# Patient Record
Sex: FEMALE | Employment: FULL TIME
[De-identification: names, ages, dates, MRNs, and addresses within clinical notes are randomized per-mention and may not be internally consistent; named-entity substitution may affect disease eponyms.]

---

## 2017-11-12 ENCOUNTER — HEALTH MAINTENANCE LETTER (OUTPATIENT)
Age: 37
End: 2017-11-12

## 2020-01-27 ENCOUNTER — TELEPHONE (OUTPATIENT)
Dept: NEUROLOGY | Facility: CLINIC | Age: 40
End: 2020-01-27

## 2020-01-27 NOTE — TELEPHONE ENCOUNTER
Health Call Center    Phone Message    May a detailed message be left on voicemail: no    Reason for Call: Other: Pt, Veronica, was seeing Dr. Calvo from [facility], she would like to have a call back about a referral for Accupuncture.  [phone]     Action Taken: Message routed to:  [facility] Neurology

## 2020-01-28 NOTE — TELEPHONE ENCOUNTER
Returned phone call to patient to let her know she would need to be scheduled with a new provider, since she hasn't been seen for 4 years, and Dr. Calvo is no longer with the [facility]. She did not want to schedule at this time, and requested her last clinic visit note be faxed to her. She will try her PCP and see if they will write a referral to acupuncture.

## 2020-03-01 ENCOUNTER — HEALTH MAINTENANCE LETTER (OUTPATIENT)
Age: 40
End: 2020-03-01

## 2020-12-14 ENCOUNTER — HEALTH MAINTENANCE LETTER (OUTPATIENT)
Age: 40
End: 2020-12-14

## 2021-04-18 ENCOUNTER — HEALTH MAINTENANCE LETTER (OUTPATIENT)
Age: 41
End: 2021-04-18

## 2021-10-02 ENCOUNTER — HEALTH MAINTENANCE LETTER (OUTPATIENT)
Age: 41
End: 2021-10-02

## 2022-05-14 ENCOUNTER — HEALTH MAINTENANCE LETTER (OUTPATIENT)
Age: 42
End: 2022-05-14

## 2022-05-18 ENCOUNTER — MEDICAL CORRESPONDENCE (OUTPATIENT)
Dept: HEALTH INFORMATION MANAGEMENT | Facility: CLINIC | Age: 42
End: 2022-05-18
Payer: COMMERCIAL

## 2022-05-24 ENCOUNTER — TRANSCRIBE ORDERS (OUTPATIENT)
Dept: OTHER | Age: 42
End: 2022-05-24
Payer: COMMERCIAL

## 2022-05-24 DIAGNOSIS — H43.391 VITREOUS FLOATERS OF RIGHT EYE: Primary | ICD-10-CM

## 2022-05-24 DIAGNOSIS — G89.4 CHRONIC PAIN DISORDER: ICD-10-CM

## 2022-05-24 DIAGNOSIS — H04.123 DRY EYES, BILATERAL: ICD-10-CM

## 2022-05-26 ENCOUNTER — TELEPHONE (OUTPATIENT)
Dept: OPHTHALMOLOGY | Facility: CLINIC | Age: 42
End: 2022-05-26
Payer: COMMERCIAL

## 2022-05-26 NOTE — TELEPHONE ENCOUNTER
Visual snow referral to neuro-ophthalmology times 1.5 years    Scheduled first available with Dr. Wade June 21st at 0730      Pt aware of date/time/location/duration/main clinic number/hospital based clinic and would appreciate new patient pat    Toan Ibrahim RN 4:28 PM 05/26/22            [facility]    Phone Message    May a detailed message be left on voicemail: yes     Reason for Call: Appointment Intake    Referring Provider Name: LujanRonnie SHAD   Diagnosis and/or Symptoms: Vitreous floaters of right eye [H43.391]  Chronic pain disorder [G89.4]  Dry eyes, bilateral [H04.123]    Per protocols sending a TE for scheduling options. Please reach out to patient to schedule appt. Thank you     Action Taken: Message routed to:  [facility] EYE    Travel Screening: Not Applicable

## 2022-05-27 ENCOUNTER — TELEPHONE (OUTPATIENT)
Dept: OPHTHALMOLOGY | Facility: CLINIC | Age: 42
End: 2022-05-27
Payer: COMMERCIAL

## 2022-05-27 NOTE — TELEPHONE ENCOUNTER
Mailed out a new pt packet with time,date and a map     Angela Keating Communication Facilitator on 5/27/2022 at 8:24 AM

## 2022-06-21 ENCOUNTER — TELEPHONE (OUTPATIENT)
Dept: OPHTHALMOLOGY | Facility: CLINIC | Age: 42
End: 2022-06-21

## 2022-06-21 ENCOUNTER — OFFICE VISIT (OUTPATIENT)
Dept: OPHTHALMOLOGY | Facility: CLINIC | Age: 42
End: 2022-06-21
Attending: OPHTHALMOLOGY
Payer: COMMERCIAL

## 2022-06-21 DIAGNOSIS — H53.40 VISUAL FIELD DEFECT: Primary | ICD-10-CM

## 2022-06-21 DIAGNOSIS — H04.123 DRY EYES, BILATERAL: ICD-10-CM

## 2022-06-21 DIAGNOSIS — H53.40 VISUAL FIELD DEFECT: ICD-10-CM

## 2022-06-21 DIAGNOSIS — G89.4 CHRONIC PAIN DISORDER: ICD-10-CM

## 2022-06-21 DIAGNOSIS — H43.391 VITREOUS FLOATERS OF RIGHT EYE: ICD-10-CM

## 2022-06-21 PROCEDURE — 92083 EXTENDED VISUAL FIELD XM: CPT | Mod: 26 | Performed by: INTERNAL MEDICINE

## 2022-06-21 PROCEDURE — 92133 CPTRZD OPH DX IMG PST SGM ON: CPT | Performed by: OPHTHALMOLOGY

## 2022-06-21 PROCEDURE — 92133 CPTRZD OPH DX IMG PST SGM ON: CPT | Mod: 26 | Performed by: INTERNAL MEDICINE

## 2022-06-21 PROCEDURE — G0463 HOSPITAL OUTPT CLINIC VISIT: HCPCS

## 2022-06-21 PROCEDURE — 99204 OFFICE O/P NEW MOD 45 MIN: CPT | Mod: GC | Performed by: INTERNAL MEDICINE

## 2022-06-21 PROCEDURE — 92083 EXTENDED VISUAL FIELD XM: CPT | Performed by: OPHTHALMOLOGY

## 2022-06-21 RX ORDER — MIDODRINE HYDROCHLORIDE 2.5 MG/1
TABLET ORAL
COMMUNITY
Start: 2022-06-16

## 2022-06-21 ASSESSMENT — TONOMETRY
OD_IOP_MMHG: 16
OS_IOP_MMHG: 15
IOP_METHOD: TONOPEN

## 2022-06-21 ASSESSMENT — CONF VISUAL FIELD
OD_NORMAL: 1
METHOD: COUNTING FINGERS

## 2022-06-21 ASSESSMENT — CUP TO DISC RATIO
OD_RATIO: 0.1
OS_RATIO: 0.1

## 2022-06-21 ASSESSMENT — VISUAL ACUITY
OD_SC: 20/20
METHOD: SNELLEN - LINEAR
OS_SC: 20/25

## 2022-06-21 ASSESSMENT — EXTERNAL EXAM - RIGHT EYE: OD_EXAM: NORMAL

## 2022-06-21 ASSESSMENT — EXTERNAL EXAM - LEFT EYE: OS_EXAM: NORMAL

## 2022-06-21 NOTE — Clinical Note
"6/21/2022       RE: Veronica Valdovinos  [address]  [facility] [address]     Dear Colleague,    Thank you for referring your patient, Veronica Valdovinos, to the [facility] EYE [facility] at [facility]. Please see a copy of my visit note below.         Assessment & Plan     Veronica Valdovinos is a 42 year old female with the following diagnoses:   1. Vitreous floaters of right eye    2. Chronic pain disorder    3. Dry eyes, bilateral    4. Visual field defect         Patient was sent for consultation by Dr. Ronnie Lujan for visual snow.     HPI:  Patient got COVID vaccine in January 2021 and about a month later began to have floaters in both eyes. Started in the right eye but moved to the left eye. She has seen an two optometrists, 2 retina specialist, and Dr. Ng 2/1/22. Since seeing Dr. 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     She has been diagnosed with dry eye. Prior treatment with steroid gtt. Three months ago tried Regener-Eyes Ophthalmic Solution which helped somewhat. Lubricating drops 3-4x per day. Warm compresses every night which have helped. Dryness has improved since these interventions.     She also has had headaches with worsening light sensitivity.     She reports that she has numerous other systemic symptoms including shortness of breath without a definitive etiology and tachycardia. She is being worked up for POTS. She is searching for a unifying diagnosis.    Independent historians:  Patient    Review of outside testing:  MR brain wwo contrast 7/9/21  IMPRESSION:   HEAD MRI:   1.  Solitary nonspecific focus of T2 FLAIR hyperintensity in the right centrum semiovale, strictly nonspecific.   2.  No additional intracranial abnormality.     CERVICAL SPINE MRI:   1.  No abnormal cord signal. " "  2.  Multilevel cervical spondylosis.   3.  No high-grade spinal canal stenosis. Severe neural foraminal stenosis bilaterally at C5-C6. Moderate left neural foraminal stenosis at C6-C7.    My interpretation performed today of outside testing:  Not available for personal review    Review of outside clinical notes:  Dr. Ronnie Lujan clinic notes    Dr. Ng 2/1/22  Today, Veronica reports stabbing pain in the left eye at night a few times a week. Floaters started in the right eye about 10 months ago. She has seen 3 retina doctors along with a dry eye specialist. She reports that it started in the right eye and now has moved over to the left. She describes it as looking thru cotton wool that are constantly, and remains there with her eyes closed. Some are clear, some are dark, most blur her vision. The left eye has gotten more blurry in the last week. She calls them as lesions in her vision. She thinks they look like spot or vascular (with veins and arteries). They are through out all aspects of her vision and they tend to move with eye movement. She is very light sensitive and feels that it's very difficult for her to read. She did get a glasses prescription for working on the computer but doesn't feel that it helps.  For the last 2 weeks she sees the left periphery shake or quivers in the field of vision.   She reports that this started about 2 months after the COVID vaccine     1/14/2022, Dr. Fede Leos, Specialty: [facility]   - Patient presented to clinic for visual disturbance. Awareness as constant \"cotton wool\" appearance which obscures much of her vision  - Unable to determine if this sounds consistent with vitreous opacities or another unrelated phenomenon, such as visual snow or entopic phenomenon as dilated retinal exam showed no significant finding to associate with her symptoms. Vitrectomy was discussed - minimal if any syneresis noted on exam and no significant floaters/shodowing " visualized on near infrared OCT - result may no improve her symptoms.   - Consult with Neuro Ophthalmology to ensure no other identifiable causes prior to considering PPV.     Past medical history:  Migraines  Fibromyalgia  Raynaud's  Alopecia  Two provoked seizures nocturnal with abnormal EEG    Medications:   CALCIUM PO  DAILY MULTIVITAMIN PO  GLUCOSAMINE SULFATE PO  OMEGA 3 PO    Family history / social history:  Aunt with fibromyalgia  CAD in grandparents  No hx of glaucoma    ARMD in grandmother    Exam:  Visual acuity 20/20 right eye 20/25 left eye.  Color vision 11/11 right eye and 11/11 left eye.  Pupils ERRL no APD  Intraocular pressure 16 right eye and 15 left eye.  Anterior segment exam PEE OU.  Fundus exam ***.  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  This is an entity where patients see constant or nearly constant unformed hallucinations.  The most common description that patients give is television snow, however it has been described as red dots, pixelations, and black spots.  Given the normal visual field, no further workup is necessary.  I reassured the patient that this is a common phenomenon and is not vision threatening.  I also told the patient that learning to ignore it is the best strategy and that there is no treatment for it.   This was first described in an article published in Neurology. 1995 Apr;45(4):664-8. Persistent positive visual phenomena in migraine. Aravind GT1, Vasyl NJ, Chelsie SL, Jena POTTS, Cornelius F, Romaine GS.     She also has significant Dry eye symptoms.  I will start her on xiidra twice a day.      She has vasovagal episodes and is planning on going to [place] to have  this evaluated.  She also will see neuro-oph there.                Attending Physician Attestation:  Complete documentation of historical and exam elements from today's encounter can be found in the full encounter summary report (not reduplicated in this progress note).  I personally obtained the chief complaint(s) and history of present illness.  I confirmed and edited as necessary the review of systems, past medical/surgical history, family history, social history, and examination findings as documented by others; and I examined the patient myself.  I personally reviewed the relevant tests, images, and reports as documented above.  I formulated and edited as necessary the assessment and plan and discussed the findings and management plan with the patient and family. I personally reviewed the ophthalmic test(s) associated with this encounter, agree with the interpretation(s) as documented by the resident/fellow, and have edited the corresponding report(s) as necessary.  - Vinicio Barragan, DO   PGY-5 Neuro-Ophthalmology Fellow          Again, thank you for allowing me to participate in the care of your patient.      Sincerely,    Vinicio Wade MD

## 2022-06-21 NOTE — NURSING NOTE
"Chief Complaint(s) and History of Present Illness(es)     New Patient     In both eyes (Visual disturbance).  Onset was gradual.  This started 2 years ago.  Presenting in peripheral vision.  Charactertized as  blurred vision.  Occurring intermittently.  It is worse throughout the day.  Context:  night driving.  Since onset it is stable.  Associated symptoms include dryness, headache, photophobia and floaters.  Negative for double vision.  Pain was noted as 0/10.              Comments     Visual disturbance started off as floaters, gradually worsening. Increased fatigue and has had some neurological symptoms such as burning foot and increased heart rate.  Has been seen by retina and neuro-oph at [facility].  Will be getting tested for POTS at [place] soon, seeing neuro-oph and neurologist.  Feels like peripheral visual field is \"shaking and flashing.\"  Occasional tinnitus.  Has dry eyes each eye. Vision is stable with occasional blur, some nyctalopia with nighttime driving.  DIONICIO Boyer 6/21/2022 8:01 AM                  "

## 2022-06-21 NOTE — TELEPHONE ENCOUNTER
PA Initiation    Medication: lifitegrast (XIIDRA) 5 % opthalmic solution   Insurance Company: [employer] - Phone [phone] Fax [phone]  Pharmacy Filling the Rx: [id number] [place] [address] AT [place] [address]  Filling Pharmacy Phone: [phone]  Filling Pharmacy Fax: [phone]  Start Date: 6/21/2022

## 2022-06-21 NOTE — TELEPHONE ENCOUNTER
Prior Authorization Retail Medication Request    Medication/Dose: lifitegrast (XIIDRA) 5 % opthalmic solution  ICD code (if different than what is on RX):  Dry eyes, bilateral [H04.123]   Previously Tried and Failed:    Rationale:      Insurance Name:  [employer]  Insurance ID: [id number]    Pharmacy Information (if different than what is on RX)  Name:  [facility] [address] [place] [address] [address] AT [facility] & [place] [address]  Phone:  [phone]

## 2022-06-21 NOTE — LETTER
"2022         RE:  :  MRN: Veronica Valdovinos  1980  [id number]     Dear Dr. Lujan,    Thank you for asking me to see your very pleasant patient, Veronica Valdovinos, in neuro-ophthalmic consultation.  I would like to thank you for sending your records and I have summarized them in the history of present illness.   My assessment and plan are below.  For further details, please see my attached clinic note.      Assessment & Plan     Veronica Valdovinos is a 42 year old female with the following diagnoses:   1. Vitreous floaters of right eye    2. Chronic pain disorder    3. Dry eyes, bilateral    4. Visual field defect         Patient was sent for consultation by Dr. Ronnie Lujan for visual snow.     HPI:  Patient got COVID vaccine in 2021 and about a month later began to have floaters in both eyes. Started in the right eye but moved to the left eye. She has seen an two optometrists, 2 retina specialist, and Dr. Ng 22. Since seeing Dr. 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     She has been diagnosed with dry eye. Prior treatment with steroid gtt. Three months ago tried Regener-Eyes Ophthalmic Solution which helped somewhat. Lubricating drops 3-4x per day. Warm compresses every night which have helped. Dryness has improved since these interventions.     She also has had headaches with worsening light sensitivity.     She reports that she has numerous other systemic symptoms including shortness of breath without a definitive etiology and tachycardia. She is being worked up for POTS. She is searching for a unifying diagnosis.    Independent historians: Patient    Review of outside testing:  MR brain wwo contrast 21  IMPRESSION:   HEAD MRI:   1.  Solitary nonspecific focus of T2 FLAIR hyperintensity in the right centrum semiovale, strictly " "nonspecific.   2.  No additional intracranial abnormality.     CERVICAL SPINE MRI:   1.  No abnormal cord signal.   2.  Multilevel cervical spondylosis.   3.  No high-grade spinal canal stenosis. Severe neural foraminal stenosis bilaterally at C5-C6. Moderate left neural foraminal stenosis at C6-C7.    My interpretation performed today of outside testing:  Not available for personal review    Review of outside clinical notes:  Dr. Ronnie Lujan clinic notes    Dr. Ng 2/1/22  Today, Veronica reports stabbing pain in the left eye at night a few times a week. Floaters started in the right eye about 10 months ago. She has seen 3 retina doctors along with a dry eye specialist. She reports that it started in the right eye and now has moved over to the left. She describes it as looking thru cotton wool that are constantly, and remains there with her eyes closed. Some are clear, some are dark, most blur her vision. The left eye has gotten more blurry in the last week. She calls them as lesions in her vision. She thinks they look like spot or vascular (with veins and arteries). They are through out all aspects of her vision and they tend to move with eye movement. She is very light sensitive and feels that it's very difficult for her to read. She did get a glasses prescription for working on the computer but doesn't feel that it helps.  For the last 2 weeks she sees the left periphery shake or quivers in the field of vision.   She reports that this started about 2 months after the COVID vaccine     1/14/2022, Dr. Fede Leos, Specialty: [facility]   - Patient presented to clinic for visual disturbance. Awareness as constant \"cotton wool\" appearance which obscures much of her vision  - Unable to determine if this sounds consistent with vitreous opacities or another unrelated phenomenon, such as visual snow or entopic phenomenon as dilated retinal exam showed no significant finding to associate with her " symptoms. Vitrectomy was discussed - minimal if any syneresis noted on exam and no significant floaters/shodowing visualized on near infrared OCT - result may no improve her symptoms.   - Consult with Neuro Ophthalmology to ensure no other identifiable causes prior to considering PPV.     Past medical history:  Migraines  Fibromyalgia  Raynaud's  Alopecia  Two provoked seizures nocturnal with abnormal EEG    Medications:   CALCIUM PO  DAILY MULTIVITAMIN PO  GLUCOSAMINE SULFATE PO  OMEGA 3 PO    Family history / social history:  Aunt with fibromyalgia  CAD in grandparents  No hx of glaucoma    ARMD in grandmother    Exam:  Visual acuity 20/20 right eye 20/25 left eye.  Color vision 11/11 right eye and 11/11 left eye.  Pupils ERRL no APD  Intraocular pressure 16 right eye and 15 left eye.  Anterior segment exam PEE OU.  Fundus exam was unremarkable.      Tests ordered and interpreted today:  OCT RNFL: mean RNFL thickness 91 each eye with normal GCL  VF: G TOP WNL OU    Discussion of management / interpretation with another provider: None    Assessment/Plan:   I think it is most likely that she has persistent positive visual phenomena of migraine also known as visual snow.  This is an entity where patients see constant or nearly constant unformed hallucinations.  The most common description that patients give is television snow, however it has been described as red dots, pixelations, and black spots.  Given the normal visual field, no further workup is necessary.  I reassured the patient that this is a common phenomenon and is not vision threatening.  I also told the patient that learning to ignore it is the best strategy and that there is no treatment for it.   This was first described in an article published in Neurology. 1995 Apr;45(4):664-8. Persistent positive visual phenomena in migraine. Aravind GT1, Vasyl POTTS, Chelsie SL, Jena POTTS, Cornelius F, Romaine GS.     She also has significant Dry eye symptoms.  I will  start her on xiidra twice a day.      She has vasovagal episodes and is planning on going to [place] to have this evaluated.  She also will see neuro-oph there.        Again, thank you for allowing me to participate in the care of your patient.      Sincerely,    Vinicio Wade MD  Professor  Ophthalmology Residency   Director of Neuro-Ophthalmology  [facility] Endowed Chair  Departments of Ophthalmology, Neurology, and Neurosurgery  [facility] [address]  [address] [place] [address]  [address]  T - [phone]  F - [phone]  DARRIAN [email or web address]      CC: Ronnie Lujan  [facility]  [address] [facility] [address]  Via Fax: [phone]    DX = visual snow

## 2022-06-21 NOTE — PROGRESS NOTES
"     Assessment & Plan     Veronica Valdovinos is a 42 year old female with the following diagnoses:   1. Vitreous floaters of right eye    2. Chronic pain disorder    3. Dry eyes, bilateral    4. Visual field defect         Patient was sent for consultation by Dr. Ronnie Lujan for visual snow.     HPI:  Patient got COVID vaccine in January 2021 and about a month later began to have floaters in both eyes. Started in the right eye but moved to the left eye. She has seen an two optometrists, 2 retina specialist, and Dr. Ng 2/1/22. Since seeing Dr. Ng in February she has had worsening \"vascular lines and spots.\" Works for [employer], reads charts all day and floaters are getting in the way of the vision.     She has been diagnosed with dry eye. Prior treatment with steroid gtt. Three months ago tried Regener-Eyes Ophthalmic Solution which helped somewhat. Lubricating drops 3-4x per day. Warm compresses every night which have helped. Dryness has improved since these interventions.     She also has had headaches with worsening light sensitivity.     She reports that she has numerous other systemic symptoms including shortness of breath without a definitive etiology and tachycardia. She is being worked up for POTS. She is searching for a unifying diagnosis.    Independent historians:  Patient    Review of outside testing:  MR brain wwo contrast 7/9/21  IMPRESSION:   HEAD MRI:   1.  Solitary nonspecific focus of T2 FLAIR hyperintensity in the right centrum semiovale, strictly nonspecific.   2.  No additional intracranial abnormality.     CERVICAL SPINE MRI:   1.  No abnormal cord signal.   2.  Multilevel cervical spondylosis.   3.  No high-grade spinal canal stenosis. Severe neural foraminal stenosis bilaterally at C5-C6. Moderate left neural foraminal stenosis at C6-C7.    My interpretation performed today of outside testing:  Not available for personal review    Review of outside clinical " "notes:  Dr. Ronnie Lujan clinic notes    Dr. Ng 2/1/22  Today, Veronica reports stabbing pain in the left eye at night a few times a week. Floaters started in the right eye about 10 months ago. She has seen 3 retina doctors along with a dry eye specialist. She reports that it started in the right eye and now has moved over to the left. She describes it as looking thru cotton wool that are constantly, and remains there with her eyes closed. Some are clear, some are dark, most blur her vision. The left eye has gotten more blurry in the last week. She calls them as lesions in her vision. She thinks they look like spot or vascular (with veins and arteries). They are through out all aspects of her vision and they tend to move with eye movement. She is very light sensitive and feels that it's very difficult for her to read. She did get a glasses prescription for working on the computer but doesn't feel that it helps.  For the last 2 weeks she sees the left periphery shake or quivers in the field of vision.   She reports that this started about 2 months after the COVID vaccine     1/14/2022, Dr. Fede Leos, Specialty: [facility]   - Patient presented to clinic for visual disturbance. Awareness as constant \"cotton wool\" appearance which obscures much of her vision  - Unable to determine if this sounds consistent with vitreous opacities or another unrelated phenomenon, such as visual snow or entopic phenomenon as dilated retinal exam showed no significant finding to associate with her symptoms. Vitrectomy was discussed - minimal if any syneresis noted on exam and no significant floaters/shodowing visualized on near infrared OCT - result may no improve her symptoms.   - Consult with Neuro Ophthalmology to ensure no other identifiable causes prior to considering PPV.     Past medical history:  Migraines  Fibromyalgia  Raynaud's  Alopecia  Two provoked seizures nocturnal with abnormal " EEG    Medications:   CALCIUM PO  DAILY MULTIVITAMIN PO  GLUCOSAMINE SULFATE PO  OMEGA 3 PO    Family history / social history:  Aunt with fibromyalgia  CAD in grandparents  No hx of glaucoma    ARMD in grandmother    Exam:  Visual acuity 20/20 right eye 20/25 left eye.  Color vision 11/11 right eye and 11/11 left eye.  Pupils ERRL no APD  Intraocular pressure 16 right eye and 15 left eye.  Anterior segment exam PEE OU.  Fundus exam was unremarkable.      Tests ordered and interpreted today:  OCT RNFL: mean RNFL thickness 91 each eye with normal GCL  VF: G TOP WNL OU    Discussion of management / interpretation with another provider:   None    Assessment/Plan:   I think it is most likely that she has persistent positive visual phenomena of migraine also known as visual snow.  This is an entity where patients see constant or nearly constant unformed hallucinations.  The cause of this is unknown.  The most common description that patients give is television snow, however it has been described as red dots, pixelations, and black spots.  Given the normal visual field, no further workup is necessary.  I reassured the patient that this is a common phenomenon and is not vision threatening.  I also told the patient that learning to ignore it is the best strategy and that there is no treatment for it.   This was first described in an article published in Neurology. 1995 Apr;45(4):664-8. Persistent positive visual phenomena in migraine. Aravind GT1, Vasyl POTTS, Chelsie SL, Jena NJ, Cornelius F, Romaine GS.     She also has significant Dry eye symptoms.  I will start her on xiidra twice a day.      She has vasovagal episodes and is planning on going to [place] to have this evaluated.  She also will see neuro-oph there for a second opinion regarding her visual snow.                Attending Physician Attestation:  Complete documentation of historical and exam elements from today's encounter can be found in the full encounter  summary report (not reduplicated in this progress note).  I personally obtained the chief complaint(s) and history of present illness.  I confirmed and edited as necessary the review of systems, past medical/surgical history, family history, social history, and examination findings as documented by others; and I examined the patient myself.  I personally reviewed the relevant tests, images, and reports as documented above.  I formulated and edited as necessary the assessment and plan and discussed the findings and management plan with the patient and family. I personally reviewed the ophthalmic test(s) associated with this encounter, agree with the interpretation(s) as documented by the resident/fellow, and have edited the corresponding report(s) as necessary.  - Vinicio Barragan, DO   PGY-5 Neuro-Ophthalmology Fellow

## 2022-06-22 RX ORDER — CYCLOSPORINE 0.5 MG/ML
1 EMULSION OPHTHALMIC 2 TIMES DAILY
Qty: 60 EACH | Refills: 11 | Status: SHIPPED | OUTPATIENT
Start: 2022-06-22

## 2022-06-22 NOTE — TELEPHONE ENCOUNTER
PRIOR AUTHORIZATION DENIED    Medication: lifitegrast (XIIDRA) 5 % opthalmic solution--DENIED    Denial Date: 6/21/2022    Denial Rational: Patient needs to try and fail generic restasis.     Appeal Information:

## 2022-06-22 NOTE — TELEPHONE ENCOUNTER
Updated pt Xiidra did not go thru and sent Rx for generic Restasis.    Reviewed may take 6 weeks or more to start taking effect    Toan Ibrahim RN 2:04 PM 06/22/22

## 2022-09-03 ENCOUNTER — HEALTH MAINTENANCE LETTER (OUTPATIENT)
Age: 42
End: 2022-09-03

## 2023-06-03 ENCOUNTER — HEALTH MAINTENANCE LETTER (OUTPATIENT)
Age: 43
End: 2023-06-03

## 2024-04-27 ENCOUNTER — HEALTH MAINTENANCE LETTER (OUTPATIENT)
Age: 44
End: 2024-04-27

## 2024-07-06 ENCOUNTER — HEALTH MAINTENANCE LETTER (OUTPATIENT)
Age: 44
End: 2024-07-06

## 2025-07-13 ENCOUNTER — HEALTH MAINTENANCE LETTER (OUTPATIENT)
Age: 45
End: 2025-07-13